# Patient Record
(demographics unavailable — no encounter records)

---

## 2025-01-13 NOTE — HISTORY OF PRESENT ILLNESS
[FreeTextEntry1] : 46 yr old pt presents for an annual. Pt is doing well without complaints. Pt requested STI evaluation.   S/P [facility] 2023

## 2025-01-13 NOTE — PLAN
[FreeTextEntry1] : 46 yr old pt presents for an annual.  Plan -GC/CT - STI bloods -Rx Mammo/Sonogram -RTO in 1yr

## 2025-01-13 NOTE — END OF VISIT
[FreeTextEntry3] : I, Herberth Padilla, acted as a scribe on behalf of Dr. Mona Kirby D.O. on 01/13/2025.   All medical entries made by the scribe were at my, Dr. Mona Kirby D.O., direction and personally dictated by me on 01/13/2025. I have reviewed the chart and agree that the record accurately reflects my personal performance of the history, physical exam, assessment and plan. I have also personally directed, reviewed, and agreed with the chart.

## 2025-01-13 NOTE — HISTORY OF PRESENT ILLNESS
[FreeTextEntry1] : 46 yr old pt presents for an annual. Pt is doing well without complaints. Pt requested STI evaluation.   S/P [facility] 2023